# Patient Record
Sex: FEMALE | Race: WHITE | URBAN - METROPOLITAN AREA
[De-identification: names, ages, dates, MRNs, and addresses within clinical notes are randomized per-mention and may not be internally consistent; named-entity substitution may affect disease eponyms.]

---

## 2021-08-09 ENCOUNTER — OFFICE VISIT (OUTPATIENT)
Dept: URBAN - METROPOLITAN AREA CLINIC 4 | Facility: CLINIC | Age: 41
End: 2021-08-09

## 2021-08-09 DIAGNOSIS — H53.8 OTHER VISUAL DISTURBANCES: Primary | ICD-10-CM

## 2021-08-09 ASSESSMENT — INTRAOCULAR PRESSURE
OD: 14
OS: 15

## 2021-08-09 ASSESSMENT — KERATOMETRY
OS: 46.00
OD: 45.88

## 2021-08-09 NOTE — IMPRESSION/PLAN
Impression: Other visual disturbances: H53.8. Plan: Laser Vision Correction Evaluation: Pentacam today shows regular astigmatism w/o signs of posterior corneal ectasia. Keratometry and pachymetry WNL. AR shows refractive error within corrective limits. Patient with reasonable expectations and no major contraindications for laser vision correction. Recommend formal evaluation for refractive surgery after discontinuing contact lenses for at least 2 weeks. Patient will perform a self trial of monovision with current contact lenses she has at home by wearing RT lens only for distance and not inserting contact lens left eye. Gave pricing and surgery dates available. RTC if interested.

## 2022-03-31 ENCOUNTER — REFRACTIVE (OUTPATIENT)
Dept: URBAN - METROPOLITAN AREA CLINIC 4 | Facility: CLINIC | Age: 42
End: 2022-03-31

## 2022-04-02 ENCOUNTER — POST-OPERATIVE VISIT (OUTPATIENT)
Dept: URBAN - METROPOLITAN AREA CLINIC 4 | Facility: CLINIC | Age: 42
End: 2022-04-02

## 2022-04-02 PROCEDURE — 99024 POSTOP FOLLOW-UP VISIT: CPT | Performed by: OPHTHALMOLOGY

## 2022-04-02 NOTE — IMPRESSION/PLAN
Impression: S/P LASIK - [facility] OU - 1 Day. Encounter for surgical aftercare following surgery on a sense organ  Z48.810. Plan: POD#1 s/p LASIK both eyes- healing well, flap intact. Continue Pred-Moxi eye drops QID until next visit. Cont. Vitamin C 1,000mg PO QD, sunglasses protection and preservative free artificial tears [id number] while awake until next visit. Patient instructed to continue precautions of not rubbing the eyes, to sleep with protective goggles until the next visit. F/U in 1 week, sooner PRN.

## 2022-04-06 ENCOUNTER — POST-OPERATIVE VISIT (OUTPATIENT)
Dept: URBAN - METROPOLITAN AREA CLINIC 4 | Facility: CLINIC | Age: 42
End: 2022-04-06

## 2022-04-06 DIAGNOSIS — Z48.810 ENCOUNTER FOR SURGICAL AFTERCARE FOLLOWING SURGERY ON A SENSE ORGAN: Primary | ICD-10-CM

## 2022-04-06 PROCEDURE — 99024 POSTOP FOLLOW-UP VISIT: CPT | Performed by: OPHTHALMOLOGY

## 2022-04-06 ASSESSMENT — INTRAOCULAR PRESSURE
OD: 9
OS: 9

## 2022-04-29 ENCOUNTER — POST-OPERATIVE VISIT (OUTPATIENT)
Dept: URBAN - METROPOLITAN AREA CLINIC 4 | Facility: CLINIC | Age: 42
End: 2022-04-29

## 2022-04-29 PROCEDURE — 99024 POSTOP FOLLOW-UP VISIT: CPT | Performed by: OPHTHALMOLOGY

## 2022-04-29 ASSESSMENT — INTRAOCULAR PRESSURE
OS: 10
OD: 11

## 2022-04-29 NOTE — IMPRESSION/PLAN
Impression: S/P LASIK - [facility] OU - 28 Days. Encounter for surgical aftercare following surgery on a sense organ  Z48.810. Plan: POM#1 s/p LASIK both eyes - well healed, off Pred-Moxi, patient happy with results. Stop Vitamin C. Cont. sunglasses protection and preservative free artificial tears QID prn. F/U per PO schedule.

## 2022-05-11 ENCOUNTER — POST-OPERATIVE VISIT (OUTPATIENT)
Dept: URBAN - METROPOLITAN AREA CLINIC 4 | Facility: CLINIC | Age: 42
End: 2022-05-11

## 2022-05-11 DIAGNOSIS — Z48.810 ENCOUNTER FOR SURGICAL AFTERCARE FOLLOWING SURGERY ON A SENSE ORGAN: Primary | ICD-10-CM

## 2022-05-11 PROCEDURE — 99024 POSTOP FOLLOW-UP VISIT: CPT | Performed by: OPHTHALMOLOGY

## 2022-05-11 ASSESSMENT — INTRAOCULAR PRESSURE
OS: 10
OD: 8

## 2022-05-11 NOTE — IMPRESSION/PLAN
Impression: S/P LASIK - [facility] OU - 40 Days. Encounter for surgical aftercare following surgery on a sense organ  Z48.810. Plan: POM#1 s/p LASIK both eyes - well healed, off Pred-Moxi, patient happy with results. Stop Vitamin C. Cont. sunglasses protection and preservative free artificial tears QID prn. F/U at the anniversary of the surgery for a repeat dilated exam and final PO visit. --Advised patient to use artificial tears for comfort.

## 2023-04-05 ENCOUNTER — POST-OPERATIVE VISIT (OUTPATIENT)
Dept: URBAN - METROPOLITAN AREA CLINIC 4 | Facility: CLINIC | Age: 43
End: 2023-04-05

## 2023-04-05 DIAGNOSIS — Z48.810 ENCOUNTER FOR SURGICAL AFTERCARE FOLLOWING SURGERY ON A SENSE ORGAN: Primary | ICD-10-CM

## 2023-04-05 PROCEDURE — 99024 POSTOP FOLLOW-UP VISIT: CPT | Performed by: OPHTHALMOLOGY

## 2023-04-05 ASSESSMENT — KERATOMETRY
OS: 44.75
OD: 44.25

## 2023-04-05 NOTE — IMPRESSION/PLAN
Impression: S/P LASIK OU - 369 Days. Encounter for surgical aftercare following surgery on a sense organ  Z48.810. Plan: POY#1 s/p LASIK both eyes - well healed, patient happy with results. Cont. sunglasses protection and artificial tears prn.  F/U yearly for a repeat dilated exam.